# Patient Record
Sex: FEMALE | Race: WHITE | Employment: FULL TIME | URBAN - METROPOLITAN AREA
[De-identification: names, ages, dates, MRNs, and addresses within clinical notes are randomized per-mention and may not be internally consistent; named-entity substitution may affect disease eponyms.]

---

## 2018-06-25 ENCOUNTER — HOSPITAL ENCOUNTER (OUTPATIENT)
Dept: GENERAL RADIOLOGY | Age: 38
Discharge: HOME | End: 2018-06-25
Payer: OTHER MISCELLANEOUS

## 2018-06-25 ENCOUNTER — OFFICE VISIT (OUTPATIENT)
Dept: INTERNAL MEDICINE CLINIC | Age: 38
End: 2018-06-25

## 2018-06-25 VITALS
OXYGEN SATURATION: 100 % | SYSTOLIC BLOOD PRESSURE: 136 MMHG | DIASTOLIC BLOOD PRESSURE: 84 MMHG | TEMPERATURE: 97.3 F | HEART RATE: 75 BPM | WEIGHT: 293 LBS | RESPIRATION RATE: 14 BRPM | BODY MASS INDEX: 41.95 KG/M2 | HEIGHT: 70 IN

## 2018-06-25 DIAGNOSIS — M25.562 ACUTE PAIN OF LEFT KNEE: ICD-10-CM

## 2018-06-25 DIAGNOSIS — M25.562 ACUTE PAIN OF LEFT KNEE: Primary | ICD-10-CM
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
## 2018-06-25 DIAGNOSIS — M25.522 LEFT ELBOW PAIN: ICD-10-CM

## 2018-06-25 PROCEDURE — 73562 X-RAY EXAM OF KNEE 3: CPT

## 2018-06-25 PROCEDURE — 73080 X-RAY EXAM OF ELBOW: CPT

## 2018-06-25 RX ORDER — LOSARTAN POTASSIUM 100 MG/1
100 TABLET ORAL DAILY
COMMUNITY

## 2018-06-25 RX ORDER — LEVOTHYROXINE SODIUM 88 UG/1
TABLET ORAL
COMMUNITY

## 2018-06-25 NOTE — PROGRESS NOTES
Here today for LT knee and LT elbow pain x 10 days. Rox Tom on 6/15/18 at training site in [place], [place]. States pain is 8-9/10 with pressure. Painful when squatting or kneeling goes to to 10/10. No pressure pain level is a 3-4/10. Has scrape on LT knee cap. LT elbow pain when leaning on it 6/10. Has bruise on LT elbow. PCP is Dr. Andrés Avalos at [facility], [facility]. in [facility]. No ER/UC visits in past 12 months.

## 2018-06-25 NOTE — PROGRESS NOTES
HISTORY OF PRESENT ILLNESS  Roberto Barbour is a 41 y.o. female. Elbow Pain    The history is provided by the patient. This is a new problem. The current episode started more than 1 week ago. The problem occurs constantly. The problem has not changed since onset. The pain is present in the left elbow. The quality of the pain is described as aching. The pain is at a severity of 4/10. The pain is mild. Associated symptoms include limited range of motion. Pertinent negatives include no numbness, no stiffness, no tingling, no itching, no back pain and no neck pain. The symptoms are aggravated by movement and palpation. She has tried nothing for the symptoms. There has been a history of trauma (fall). Knee Pain   The history is provided by the patient. This is a new problem. The current episode started more than 1 week ago. The problem occurs constantly. The problem has not changed since onset. Pertinent negatives include no chest pain, no headaches and no shortness of breath. The symptoms are aggravated by standing. Nothing relieves the symptoms. She has tried nothing for the symptoms. The treatment provided no relief. Work Related Injury   Pertinent negatives include no chest pain, no headaches and no shortness of breath. Review of Systems   Constitutional: Negative for chills, fever and malaise/fatigue. HENT: Negative for congestion. Eyes: Negative for blurred vision and double vision. Respiratory: Negative for shortness of breath. Cardiovascular: Negative for chest pain and palpitations. Gastrointestinal: Negative for heartburn, nausea and vomiting. Musculoskeletal: Positive for falls and joint pain. Negative for back pain, myalgias, neck pain and stiffness. Skin: Negative for itching. Neurological: Negative for dizziness, tingling, numbness and headaches. Endo/Heme/Allergies: Negative for environmental allergies and polydipsia. Psychiatric/Behavioral: The patient is not nervous/anxious. Physical Exam   Constitutional: She is oriented to person, place, and time. She appears well-developed and well-nourished. No distress. HENT:   Head: Normocephalic and atraumatic. Mouth/Throat: Oropharynx is clear and moist. No oropharyngeal exudate. Eyes: EOM are normal. Pupils are equal, round, and reactive to light. Neck: Neck supple. Cardiovascular: Regular rhythm. Pulmonary/Chest: Breath sounds normal. No respiratory distress. She has no wheezes. Musculoskeletal:        Left elbow: She exhibits laceration. She exhibits normal range of motion, no swelling and no deformity. Tenderness found. Left knee: She exhibits decreased range of motion and swelling. She exhibits no effusion, no erythema, normal alignment and normal patellar mobility. Tenderness found. Neurological: She is alert and oriented to person, place, and time. No cranial nerve deficit. Skin: Skin is dry. She is not diaphoretic. Psychiatric: She has a normal mood and affect. Nursing note and vitals reviewed. ASSESSMENT and PLAN    ICD-10-CM ICD-9-CM    1. Acute pain of left knee M25.562 719.46 XR ELBOW LT MIN 3 V      XR PATELLA LT 3 V   2. Left elbow pain M25.522 719.42 XR ELBOW LT MIN 3 V      XR PATELLA LT 3 V   3. Fall, initial encounter [address] XXXA E888.9 XR ELBOW LT MIN 3 V      XR PATELLA LT 3 V   patient is given knee brace for compression and support. Advised to take ibuprofen for pain. Further plan of care will be established according to xray results.

## 2018-06-25 NOTE — PATIENT INSTRUCTIONS
Knee Pain or Injury: Care Instructions  Your Care Instructions    Injuries are a common cause of knee problems. Sudden (acute) injuries may be caused by a direct blow to the knee. They can also be caused by abnormal twisting, bending, or falling on the knee. Pain, bruising, or swelling may be severe, and may start within minutes of the injury. Overuse is another cause of knee pain. Other causes are climbing stairs, kneeling, and other activities that use the knee. Everyday wear and tear, especially as you get older, also can cause knee pain. Rest, along with home treatment, often relieves pain and allows your knee to heal. If you have a serious knee injury, you may need tests and treatment. Follow-up care is a key part of your treatment and safety. Be sure to make and go to all appointments, and call your doctor if you are having problems. It's also a good idea to know your test results and keep a list of the medicines you take. How can you care for yourself at home? · Be safe with medicines. Read and follow all instructions on the label. ¨ If the doctor gave you a prescription medicine for pain, take it as prescribed. ¨ If you are not taking a prescription pain medicine, ask your doctor if you can take an over-the-counter medicine. · Rest and protect your knee. Take a break from any activity that may cause pain. · Put ice or a cold pack on your knee for 10 to 20 minutes at a time. Put a thin cloth between the ice and your skin. · Prop up a sore knee on a pillow when you ice it or anytime you sit or lie down for the next 3 days. Try to keep it above the level of your heart. This will help reduce swelling. · If your knee is not swollen, you can put moist heat, a heating pad, or a warm cloth on your knee. · If your doctor recommends an elastic bandage, sleeve, or other type of support for your knee, wear it as directed.   · Follow your doctor's instructions about how much weight you can put on your leg. Use a cane, crutches, or a walker as instructed. · Follow your doctor's instructions about activity during your healing process. If you can do mild exercise, slowly increase your activity. · Reach and stay at a healthy weight. Extra weight can strain the joints, especially the knees and hips, and make the pain worse. Losing even a few pounds may help. When should you call for help? Call 911 anytime you think you may need emergency care. For example, call if:  ? · You have symptoms of a blood clot in your lung (called a pulmonary embolism). These may include:  ¨ Sudden chest pain. ¨ Trouble breathing. ¨ Coughing up blood. ?Call your doctor now or seek immediate medical care if:  ? · You have severe or increasing pain. ? · Your leg or foot turns cold or changes color. ? · You cannot stand or put weight on your knee. ? · Your knee looks twisted or bent out of shape. ? · You cannot move your knee. ? · You have signs of infection, such as:  ¨ Increased pain, swelling, warmth, or redness. ¨ Red streaks leading from the knee. ¨ Pus draining from a place on your knee. ¨ A fever. ? · You have signs of a blood clot in your leg (called a deep vein thrombosis), such as:  ¨ Pain in your calf, back of the knee, thigh, or groin. ¨ Redness and swelling in your leg or groin. ? Watch closely for changes in your health, and be sure to contact your doctor if:  ? · You have tingling, weakness, or numbness in your knee. ? · You have any new symptoms, such as swelling. ? · You have bruises from a knee injury that last longer than 2 weeks. ? · You do not get better as expected. Where can you learn more? Go to http://ginger-devendra.info/. Enter K195 in the search box to learn more about \"Knee Pain or Injury: Care Instructions. \"  Current as of: March 20, 2017  Content Version: 11.4  © 9785-6121 [employer].  Care instructions adapted under license by [employer] (which disclaims liability or warranty for this information). If you have questions about a medical condition or this instruction, always ask your healthcare professional. [address] any warranty or liability for your use of this information.

## 2018-06-25 NOTE — MR AVS SNAPSHOT
[facility] 
 
 
 [address] [facility] [id number] 
[phone] Patient: Nai Núñez MRN: [id number] Visit Information Date & Time Provider Department Dept. Phone Encounter #  
 2018 12:30 PM LINDSEY Macieldonna [facility] 139 [id number] Upcoming Health Maintenance Date Due DTaP/Tdap/Td series (1 - Tdap) 2003 PAP AKA CERVICAL CYTOLOGY 2003 Influenza Age 5 to Adult 2018 Allergies as of 2018  Review Complete On: 2018 By: Nathanael Tadeo No Known Allergies Current Immunizations  Never Reviewed No immunizations on file. Not reviewed this visit You Were Diagnosed With   
  
 Codes Comments Acute pain of left knee    -  Primary ICD-10-CM: K31.292 ICD-9-CM: 719.46 Left elbow pain     ICD-10-CM: H66.202 ICD-9-CM: 719.42 Fall, initial encounter     ICD-10-CM: W19. Nena Closplint ICD-9-CM: E888.9 Vitals BP Pulse Temp Resp Height(growth percentile) Weight(growth percentile) 136/84 (BP 1 Location: Right arm, BP Patient Position: Sitting) 75 97.3 °F (36.3 °C) (Oral) 14 5' 10\" (1.778 m) 307 lb 12.8 oz (139.6 kg) LMP SpO2 BMI OB Status Smoking Status 2018 100% 44.16 kg/m2 Having regular periods Never Smoker Vitals History BMI and BSA Data Body Mass Index Body Surface Area  
 44.16 kg/m 2 2.63 m 2 Your Updated Medication List  
  
   
This list is accurate as of 18  1:29 PM.  Always use your most recent med list.  
  
  
  
  
 levothyroxine 88 mcg tablet Commonly known as:  SYNTHROID Take  by mouth Daily (before breakfast). losartan 100 mg tablet Commonly known as:  COZAAR Take 100 mg by mouth daily. To-Do List   
 2018 Imaging:  XR ELBOW LT MIN 3 V   
  
 2018 Imaging:  XR PATELLA LT 3 V Patient Instructions Knee Pain or Injury: Care Instructions Your Care Instructions Injuries are a common cause of knee problems. Sudden (acute) injuries may be caused by a direct blow to the knee. They can also be caused by abnormal twisting, bending, or falling on the knee. Pain, bruising, or swelling may be severe, and may start within minutes of the injury. Overuse is another cause of knee pain. Other causes are climbing stairs, kneeling, and other activities that use the knee. Everyday wear and tear, especially as you get older, also can cause knee pain. Rest, along with home treatment, often relieves pain and allows your knee to heal. If you have a serious knee injury, you may need tests and treatment. Follow-up care is a key part of your treatment and safety. Be sure to make and go to all appointments, and call your doctor if you are having problems. It's also a good idea to know your test results and keep a list of the medicines you take. How can you care for yourself at home? · Be safe with medicines. Read and follow all instructions on the label. ¨ If the doctor gave you a prescription medicine for pain, take it as prescribed. ¨ If you are not taking a prescription pain medicine, ask your doctor if you can take an over-the-counter medicine. · Rest and protect your knee. Take a break from any activity that may cause pain. · Put ice or a cold pack on your knee for 10 to 20 minutes at a time. Put a thin cloth between the ice and your skin. · Prop up a sore knee on a pillow when you ice it or anytime you sit or lie down for the next 3 days. Try to keep it above the level of your heart. This will help reduce swelling. · If your knee is not swollen, you can put moist heat, a heating pad, or a warm cloth on your knee. · If your doctor recommends an elastic bandage, sleeve, or other type of support for your knee, wear it as directed.  
· Follow your doctor's instructions about how much weight you can put on your leg. Use a cane, crutches, or a walker as instructed. · Follow your doctor's instructions about activity during your healing process. If you can do mild exercise, slowly increase your activity. · Reach and stay at a healthy weight. Extra weight can strain the joints, especially the knees and hips, and make the pain worse. Losing even a few pounds may help. When should you call for help? Call 911 anytime you think you may need emergency care. For example, call if: 
? · You have symptoms of a blood clot in your lung (called a pulmonary embolism). These may include: 
¨ Sudden chest pain. ¨ Trouble breathing. ¨ Coughing up blood. ?Call your doctor now or seek immediate medical care if: 
? · You have severe or increasing pain. ? · Your leg or foot turns cold or changes color. ? · You cannot stand or put weight on your knee. ? · Your knee looks twisted or bent out of shape. ? · You cannot move your knee. ? · You have signs of infection, such as: 
¨ Increased pain, swelling, warmth, or redness. ¨ Red streaks leading from the knee. ¨ Pus draining from a place on your knee. ¨ A fever. ? · You have signs of a blood clot in your leg (called a deep vein thrombosis), such as: 
¨ Pain in your calf, back of the knee, thigh, or groin. ¨ Redness and swelling in your leg or groin. ? Watch closely for changes in your health, and be sure to contact your doctor if: 
? · You have tingling, weakness, or numbness in your knee. ? · You have any new symptoms, such as swelling. ? · You have bruises from a knee injury that last longer than 2 weeks. ? · You do not get better as expected. Where can you learn more? Go to http://ginger-devendra.info/. Enter K195 in the search box to learn more about \"Knee Pain or Injury: Care Instructions. \" Current as of: March 20, 2017 Content Version: 11.4 © 1556-5191 [employer].  Care instructions adapted under license by [address] (which disclaims liability or warranty for this information). If you have questions about a medical condition or this instruction, always ask your healthcare professional. [address] any warranty or liability for your use of this information. Introducing [facility]! Landy Castillo introduces [employer] patient portal. Now you can access parts of your medical record, email your doctor's office, and request medication refills online. 1. In your internet browser, go to [email or web address] 2. Click on the First Time User? Click Here link in the Sign In box. You will see the New Member Sign Up page. 3. Enter your [employer] Access Code exactly as it appears below. You will not need to use this code after youve completed the sign-up process. If you do not sign up before the expiration date, you must request a new code. · [employer] Access Code: [email or web address] Expires: 9/23/2018  1:29 PM 
 
4. Enter the last four digits of your Social Security Number (xxxx) and Date of Birth (mm/dd/yyyy) as indicated and click Submit. You will be taken to the next sign-up page. 5. Create a [employer] ID. This will be your [employer] login ID and cannot be changed, so think of one that is secure and easy to remember. 6. Create a [employer] password. You can change your password at any time. 7. Enter your Password Reset Question and Answer. This can be used at a later time if you forget your password. 8. Enter your e-mail address. You will receive e-mail notification when new information is available in [address] 9. Click Sign Up. You can now view and download portions of your medical record. 10. Click the Download Summary menu link to download a portable copy of your medical information. If you have questions, please visit the Frequently Asked Questions section of the [employer] website.  Remember, [employer] is NOT to be used for urgent needs. For medical emergencies, dial 911. Now available from your iPhone and Android! Please provide this summary of care documentation to your next provider. If you have any questions after today's visit, please call [phone].